# Patient Record
Sex: FEMALE | Race: OTHER | NOT HISPANIC OR LATINO
[De-identification: names, ages, dates, MRNs, and addresses within clinical notes are randomized per-mention and may not be internally consistent; named-entity substitution may affect disease eponyms.]

---
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
## 2020-01-30 ENCOUNTER — APPOINTMENT (OUTPATIENT)
Dept: OTOLARYNGOLOGY | Facility: CLINIC | Age: 20
End: 2020-01-30

## 2020-07-16 ENCOUNTER — RESULT REVIEW (OUTPATIENT)
Age: 20
End: 2020-07-16

## 2020-07-16 ENCOUNTER — APPOINTMENT (OUTPATIENT)
Dept: OTOLARYNGOLOGY | Facility: CLINIC | Age: 20
End: 2020-07-16
Payer: SELF-PAY

## 2020-07-16 VITALS
WEIGHT: 213 LBS | TEMPERATURE: 98.6 F | DIASTOLIC BLOOD PRESSURE: 93 MMHG | SYSTOLIC BLOOD PRESSURE: 145 MMHG | HEIGHT: 68 IN | HEART RATE: 103 BPM | BODY MASS INDEX: 32.28 KG/M2

## 2020-07-16 PROCEDURE — 11444 EXC FACE-MM B9+MARG 3.1-4 CM: CPT

## 2020-07-16 PROCEDURE — 88305 TISSUE EXAM BY PATHOLOGIST: CPT | Mod: 26

## 2020-07-16 PROCEDURE — 12052 INTMD RPR FACE/MM 2.6-5.0 CM: CPT

## 2020-07-16 NOTE — CONSULT LETTER
[Dear  ___] : Dear  [unfilled], [Courtesy Letter:] : I had the pleasure of seeing your patient, [unfilled], in my office today. [Sincerely,] : Sincerely, [FreeTextEntry2] : Braden Brown MD\par [address]\[place] [address] [FreeTextEntry1] : \par Ms. Alcantara underwent removal of LEFT ear lobe keloid, 4 cm, on July 16, 2020.\par \par She will return next week for suture removal.\par \par \par \par \par \par \par   [FreeTextEntry3] : \par Adelina Patterson MD \par Otolaryngology, Head and Neck Surgery \par \par

## 2020-07-16 NOTE — HISTORY OF PRESENT ILLNESS
[de-identified] : For removal of LEFT earlobe keloid.\par \par Ms Quinton has a 4 cm keloid on left medial ear lobe that is enlarging over 2 years and is painful to pressure.\par I recommended removal since it had been infected. Risks, benefits, and alternatives were discussed with the patient who agreed to proceed. Risks include, but are not limited to, bleeding, infection, recurrence, scar and contour change of the ear lobe.  Her questions were answered and she agreed to proceed. \par \par PROCEDURE:   LEFT Earloid keloid removal and intermediate repair\par ANESTHESIA:  1% lidocaine with 1:100,000 epinephrine injection \par EBL:  3 ml\par COMPLICATIONS:  None\par \par After informed consent was obtained, the patient was placed in a semirecumbent position.  The subcutaneous tissue inferior to the left ear lobe was injected with 1% lidocaine with 1:100,000 epinephrine for a greater auricular nerve block.  Once the lower half of the left ear was insensate, the skin and subcutaneous tissue at the attachment of the keloid was injected with 1% lidocaine with 1:100,000 epinephrine for vasoconstriction.  Cotton was placed in the left conchal bowl.  The lower left ear was prepped with Betadine solution.  The left lower ear was draped in sterile fashion. The keloid was about 4 cm in greatest diameter, with smaller attachment of just under 3 cm.. An incision was made through the skin of the medial ear lobe at the attachment of the keloid.  Blunt and sharp dissection was then used to separate the subcutaneous tissue from the keloid.  A 3 mm portion of the lateral earlobe skin, where the original piercing had been, was excised with the keloid.  The keloid was removed completely and sent in formalin to Pathology.\par \par Hemostasis was obtained with electrocautery pen.  The defect on the lateral side of the earlobe was about 1 x 0.4 cm; on the medial side was 4 x 0.8 cm.  The subcutaneous tissue was closed with 4-0 chromic.  The medial skin edges were reapproximated with 5-0 nylon running simple suture.  The lateral skin edges were reapproximated with 5-0 nylon running simple suture.  the skin was cleaned after the drapes were removed.  Bacitracin ointment was applied to both suture lines.\par \par The patient tolerated the procedure well.\par \par

## 2020-07-17 ENCOUNTER — OUTPATIENT (OUTPATIENT)
Dept: OUTPATIENT SERVICES | Facility: HOSPITAL | Age: 20
LOS: 1 days | End: 2020-07-17
Payer: COMMERCIAL

## 2020-07-17 DIAGNOSIS — L91.0 HYPERTROPHIC SCAR: ICD-10-CM

## 2020-07-17 PROCEDURE — 88305 TISSUE EXAM BY PATHOLOGIST: CPT

## 2020-07-21 LAB — SURGICAL PATHOLOGY STUDY: SIGNIFICANT CHANGE UP

## 2020-07-24 ENCOUNTER — APPOINTMENT (OUTPATIENT)
Dept: OTOLARYNGOLOGY | Facility: CLINIC | Age: 20
End: 2020-07-24
Payer: SELF-PAY

## 2020-07-24 VITALS
HEIGHT: 68 IN | DIASTOLIC BLOOD PRESSURE: 80 MMHG | SYSTOLIC BLOOD PRESSURE: 127 MMHG | WEIGHT: 213 LBS | TEMPERATURE: 95.6 F | HEART RATE: 80 BPM | BODY MASS INDEX: 32.28 KG/M2

## 2020-07-24 DIAGNOSIS — H92.02 OTALGIA, LEFT EAR: ICD-10-CM

## 2020-07-24 PROCEDURE — 99024 POSTOP FOLLOW-UP VISIT: CPT

## 2020-08-10 PROBLEM — H92.02 OTALGIA, LEFT: Status: RESOLVED | Noted: 2020-07-15 | Resolved: 2020-08-10

## 2020-08-10 RX ORDER — CEPHALEXIN 500 MG/1
500 CAPSULE ORAL 3 TIMES DAILY
Qty: 21 | Refills: 0 | Status: COMPLETED | COMMUNITY
Start: 2020-07-16 | End: 2020-07-23

## 2020-08-10 NOTE — ASSESSMENT
[FreeTextEntry1] : Sutures were removed and  left earlobe is healing well\par Patient is happy with results of left ear keloid removal.\par She has a small keloid in right earlobe that could be treated with steroid injection along.\par \par  Advised patient that she does not need to continue the antibiotics. \par Advised patient the scar will heal and to keep it out of the sun.\par \par Return in 1 month for steroid injection in ear lobes.

## 2020-08-10 NOTE — HISTORY OF PRESENT ILLNESS
[de-identified] : Ms. Alcantara is removal of keloid from left earlobe on July 16, 2020.\par Denies pus or significant pain at incision site. Incision site left a few drops of blood on her pillow the first night.\par She took 6 days of cephalexin but dropped the rest of the pills on the floor and stopped taking the antibiotics.\par She also c/o a small right ear lobe keloid that she would like to have treated.\par \par \par PATHOLOGY Left earlobe keloid (7/16/2020):\par -Skin with a hypertrophic scar (keloid) and foci of chronic inflammation.\par

## 2020-08-10 NOTE — PHYSICAL EXAM
[de-identified] : Left earlobe is healing well.  Sutures were removed; Steri strips placed.  RIGHT earlobe with 3-4mm keloid medial earlobe.

## 2020-08-10 NOTE — CONSULT LETTER
[Dear  ___] : Dear  [unfilled], [Sincerely,] : Sincerely, [Courtesy Letter:] : I had the pleasure of seeing your patient, [unfilled], in my office today. [FreeTextEntry1] : \par \par Enclosed please find my notes from office visit on July 24, 2020.\par \par  \par \par \par \par \par \par   [FreeTextEntry2] : Braden Brown MD\par [address]\[place] [address] [FreeTextEntry3] : \par Adelina Patterson MD \par Otolaryngology, Head and Neck Surgery \par \par

## 2020-08-26 ENCOUNTER — APPOINTMENT (OUTPATIENT)
Dept: OTOLARYNGOLOGY | Facility: CLINIC | Age: 20
End: 2020-08-26
Payer: COMMERCIAL

## 2020-08-26 VITALS
TEMPERATURE: 98.1 F | HEART RATE: 77 BPM | SYSTOLIC BLOOD PRESSURE: 127 MMHG | WEIGHT: 214 LBS | HEIGHT: 68 IN | BODY MASS INDEX: 32.43 KG/M2 | DIASTOLIC BLOOD PRESSURE: 79 MMHG

## 2020-08-26 PROCEDURE — 99213 OFFICE O/P EST LOW 20 MIN: CPT

## 2020-08-30 NOTE — HISTORY OF PRESENT ILLNESS
[de-identified] : Ms. Alcantara presents for follow up for ear keloids. \par S/p left ear keloid removal 7/16/2020.\par Left ear keloid healed with some scaring above the earlobe.\par Right ear has one keloid that she believes it is bigger and hurts when she lies on that side.\par She also thinks she has a keloid in vaginal area.\par \par \par PATHOLOGY Left earlobe keloid (7/18/2020)\par - skin with hypertrophic scar (keloid) and foci of chronic inflammation.\par

## 2020-08-30 NOTE — CONSULT LETTER
[Dear  ___] : Dear  [unfilled], [Courtesy Letter:] : I had the pleasure of seeing your patient, [unfilled], in my office today. [Sincerely,] : Sincerely, [FreeTextEntry2] : Braden Brown MD\par [address]\[place] [address] [FreeTextEntry1] : \par \par Enclosed please find my notes from office visit on August 26, 2020.\par \par  \par \par \par \par \par \par   [FreeTextEntry3] : \par Adelina Patterson MD \par Otolaryngology, Head and Neck Surgery \par \par

## 2020-08-30 NOTE — ASSESSMENT
[FreeTextEntry1] : Ms. Alcantara has right earlobe keloid and left earlobe hypertrophic scar after keloid injection.\par Both sites were injected with Kenalog today.\par \par PLAN:\par -- Use pressure earrings bilateral \par -- see Derm for vaginal keloid\par \par Return in 1 month for repeat injection in at least right earlobe keloid.\par \par

## 2020-08-30 NOTE — PHYSICAL EXAM
[Normal] : no abnormal secretions [de-identified] : 5 mm keloid on medial right earlobe, injected with Kenalog 40 mg/ml.  Left earupper lateral lobule with hypertrophic  scar 4 mm, injected intradermal with Kenalog 40 mg/ml.

## 2020-10-28 ENCOUNTER — APPOINTMENT (OUTPATIENT)
Dept: OTOLARYNGOLOGY | Facility: CLINIC | Age: 20
End: 2020-10-28
Payer: COMMERCIAL

## 2020-10-28 VITALS
HEIGHT: 68 IN | DIASTOLIC BLOOD PRESSURE: 97 MMHG | TEMPERATURE: 97.7 F | TEMPERATURE: 97.7 F | SYSTOLIC BLOOD PRESSURE: 132 MMHG | WEIGHT: 217.6 LBS | WEIGHT: 217.6 LBS | HEIGHT: 68 IN | BODY MASS INDEX: 32.98 KG/M2 | SYSTOLIC BLOOD PRESSURE: 132 MMHG | HEART RATE: 90 BPM | DIASTOLIC BLOOD PRESSURE: 97 MMHG | BODY MASS INDEX: 32.98 KG/M2 | HEART RATE: 90 BPM

## 2020-10-28 PROCEDURE — 99214 OFFICE O/P EST MOD 30 MIN: CPT | Mod: 25

## 2020-10-28 PROCEDURE — 11900 INJECT SKIN LESIONS </W 7: CPT

## 2020-10-28 PROCEDURE — 99072 ADDL SUPL MATRL&STAF TM PHE: CPT

## 2020-11-12 NOTE — ASSESSMENT
[FreeTextEntry1] : Ms. Alcantara is s/p excision of left earlobe keloid.  The left earlobe scar was injected intradermally with Kenalog.\par Right earlobe keloid on posterior surface is now 1 cm and too bulky to respond will to Kenalog injections.\par \par I recommended removal of keloid from RIGHT earlobe under local anesthesia in the office.\par Risks, benefits and alternatives were discussed with her. Risks include, but are not limited to, bleeding, infection, scar and recurrent keloid.\par After the removal, she may require intradermal steroid injections to prevent recurrence.\par She will return to office for procedure in November

## 2020-11-12 NOTE — PHYSICAL EXAM
[Normal] : tympanic membranes are normal in both ears [de-identified] : EACs clear bilateral  [de-identified] : 1 cm keloid on posterior right earlobe. Left earlobe healing scars anterior and posterior; scars injected with intradermal Kenalog.

## 2020-11-12 NOTE — CONSULT LETTER
[Dear  ___] : Dear  [unfilled], [Courtesy Letter:] : I had the pleasure of seeing your patient, [unfilled], in my office today. [Please see my note below.] : Please see my note below. [Sincerely,] : Sincerely, [FreeTextEntry2] : Braden Brown MD\par [address]\[place] [address] [FreeTextEntry1] : \par  \par \par \par \par \par \par   [FreeTextEntry3] : \par Adelina Patterson MD \par Otolaryngology, Head and Neck Surgery \par \par

## 2020-11-12 NOTE — HISTORY OF PRESENT ILLNESS
[de-identified] : Ms. Alcantara presents for follow up for ear keloids. \par \par S/p left ear keloid removal 7/16/2020.\par Left ear keloid healed with some keloid developing at front and back of the earlobe.\par \par Right ear has one keloid that she believes is bigger and hurts when she lies on that side.\par She has been using the compression earrings but it falls off the right ear because the keloid is so large.\par \par \par PATHOLOGY Left earlobe keloid (7/18/2020)\par - skin with hypertrophic scar (keloid) and foci of chronic inflammation.\par \par

## 2020-11-18 ENCOUNTER — NON-APPOINTMENT (OUTPATIENT)
Age: 20
End: 2020-11-18

## 2020-11-18 ENCOUNTER — APPOINTMENT (OUTPATIENT)
Dept: OTOLARYNGOLOGY | Facility: CLINIC | Age: 20
End: 2020-11-18

## 2020-12-23 ENCOUNTER — RESULT REVIEW (OUTPATIENT)
Age: 20
End: 2020-12-23

## 2020-12-23 ENCOUNTER — APPOINTMENT (OUTPATIENT)
Dept: OTOLARYNGOLOGY | Facility: CLINIC | Age: 20
End: 2020-12-23
Payer: COMMERCIAL

## 2020-12-23 ENCOUNTER — OUTPATIENT (OUTPATIENT)
Dept: OUTPATIENT SERVICES | Facility: HOSPITAL | Age: 20
LOS: 1 days | End: 2020-12-23
Payer: COMMERCIAL

## 2020-12-23 VITALS
SYSTOLIC BLOOD PRESSURE: 139 MMHG | HEART RATE: 71 BPM | HEIGHT: 68 IN | TEMPERATURE: 97.3 F | DIASTOLIC BLOOD PRESSURE: 88 MMHG | WEIGHT: 217 LBS | BODY MASS INDEX: 32.89 KG/M2

## 2020-12-23 PROCEDURE — 12051 INTMD RPR FACE/MM 2.5 CM/<: CPT

## 2020-12-23 PROCEDURE — 88305 TISSUE EXAM BY PATHOLOGIST: CPT

## 2020-12-23 PROCEDURE — 11402 EXC TR-EXT B9+MARG 1.1-2 CM: CPT

## 2020-12-23 PROCEDURE — 99072 ADDL SUPL MATRL&STAF TM PHE: CPT

## 2020-12-23 PROCEDURE — 88305 TISSUE EXAM BY PATHOLOGIST: CPT | Mod: 26

## 2020-12-23 PROCEDURE — 11900 INJECT SKIN LESIONS </W 7: CPT | Mod: 59

## 2020-12-28 DIAGNOSIS — L91.0 HYPERTROPHIC SCAR: ICD-10-CM

## 2020-12-29 LAB — SURGICAL PATHOLOGY STUDY: SIGNIFICANT CHANGE UP

## 2020-12-30 ENCOUNTER — APPOINTMENT (OUTPATIENT)
Dept: OTOLARYNGOLOGY | Facility: CLINIC | Age: 20
End: 2020-12-30
Payer: MEDICAID

## 2020-12-30 VITALS
SYSTOLIC BLOOD PRESSURE: 134 MMHG | HEIGHT: 68 IN | DIASTOLIC BLOOD PRESSURE: 87 MMHG | BODY MASS INDEX: 32.89 KG/M2 | SYSTOLIC BLOOD PRESSURE: 134 MMHG | HEART RATE: 72 BPM | TEMPERATURE: 98.1 F | WEIGHT: 217 LBS | HEIGHT: 68 IN | BODY MASS INDEX: 32.89 KG/M2 | TEMPERATURE: 98.1 F | DIASTOLIC BLOOD PRESSURE: 87 MMHG | HEART RATE: 72 BPM | WEIGHT: 217 LBS

## 2020-12-30 PROCEDURE — 99024 POSTOP FOLLOW-UP VISIT: CPT

## 2020-12-30 NOTE — CONSULT LETTER
[Dear  ___] : Dear  [unfilled], [Courtesy Letter:] : I had the pleasure of seeing your patient, [unfilled], in my office today. [Sincerely,] : Sincerely, [FreeTextEntry2] : Braden Brown MD\par [address]\[place] [address] [FreeTextEntry1] : \par Ms. Alcantara underwent removal of RIGHT ear lobe keloid, 2 cm, on December 23, 2020.\par \par She will return next week for suture removal.\par \par \par \par \par \par \par   [FreeTextEntry3] : \par Adelina Patterson MD \par Otolaryngology, Head and Neck Surgery \par \par

## 2020-12-30 NOTE — HISTORY OF PRESENT ILLNESS
[de-identified] : For removal of RIGHT earlobe keloid.\par \par Ms Alcantara has an almost 2 cm keloid on right medial ear lobe that is enlarging over past few months and is painful to pressure.  I initially injected the right earlobe keloid in October but it has continued to grow.\par A left earlobe keloid was removed in July 2020.  She has been wearing pressure earrings on left side but they keep falling off.\par \par I recommended removal of right ear keloid. Risks, benefits, and alternatives were discussed with the patient who agreed to proceed. Risks include, but are not limited to, bleeding, infection, recurrence, scar and contour change of the ear lobe. Her questions were answered and she agreed to proceed. \par \par --------------------------------------\par --------------------------------------\par \par PROCEDURE:   RIGHT Earlobe keloid removal and intermediate repair\par ANESTHESIA:  1% lidocaine with 1:100,000 epinephrine injection \par EBL:  3 ml\par COMPLICATIONS:  None\par \par After informed consent was obtained, the patient was placed in a semirecumbent position.  The subcutaneous tissue inferior to the right ear lobe was injected with 1% lidocaine with 1:100,000 epinephrine for a greater auricular nerve block.  Once the lower half of the right ear was insensate, the skin and subcutaneous tissue at the attachment of the keloid was injected with 1% lidocaine with 1:100,000 epinephrine for vasoconstriction.  The lower right ear was prepped with Betadine solution.  The right lower ear was draped in sterile fashion. The keloid was almost 2 cm in greatest diameter, with smaller attachment of just under 1.5 cm.  An incision was made through the skin of the medial ear lobe at the base of the keloid.  Blunt and sharp dissection was then used to separate the subcutaneous tissue from the keloid.  A 2 mm portion of the lateral earlobe skin, where the original piercing had been, was excised with the keloid.  The keloid was removed completely and sent in formalin to Pathology.\par Hemostasis was obtained with electrocautery pen.  \par The defect on the medial side of the earlobe was about 2.3 cm; on the lateral side about 3 mm.   The subcutaneous tissue was closed with 4-0 chromic.  The medial skin edges were reapproximated with 5-0 nylon running simple suture.  The medial and lateral skin edges were each reapproximated with 5-0 nylon running simple sutures.  \par The skin was cleaned after the drapes were removed.  Bacitracin ointment was applied to both suture lines.\par \par The patient tolerated the procedure well.\par \par

## 2020-12-30 NOTE — PHYSICAL EXAM
[de-identified] : Left ear lobe scar with mild thickening - injected intradermally with Kenalog 40 mg/ml.

## 2020-12-30 NOTE — PHYSICAL EXAM
[Normal] : tympanic membranes are normal in both ears [de-identified] : Right earlobe sutures removed and ster-strips placed. No erythema or edema. Left ear lobe scar mild thickening.

## 2020-12-30 NOTE — ASSESSMENT
[FreeTextEntry1] : Ms. Alcantara is s/p right ear keloid removal 12/23/2020.  Sutures removed today\par S/p left ear keloid removal 7/16/2020.\par \par PLAN:\par -- Use pressure earrings on left ear\par -- once steri-strips fall off right earlobe, massage surgical site and apply emollients \par \par Return in 3 weeks\par \par

## 2020-12-30 NOTE — ASSESSMENT
[FreeTextEntry1] : Right ear keloid was removed, and the defect was repaired.\par Left earlobe scar injected with Kenalog intradermally.\par \par Return in 1 week for suture removal from RIGHT earlobe.\par Wear pressure earrings on left earlobe.\par

## 2020-12-30 NOTE — HISTORY OF PRESENT ILLNESS
[de-identified] : Ms. Alcantara  is s/p right ear keloid removal 12/23/2020; also had intralesional steroid injection right wound and left earlobe scar.\par Had some bleeding the same day after procedure\par Denies any significant pain.\par S/p left ear keloid removal 7/16/2020.\par \par \par PATHOLOGY Skin of right earlobe, lesion excision (12/23/2020)\par - Keloid\par \par PATHOLOGY Left earlobe keloid (7/18/2020)\par - skin with hypertrophic scar (keloid) and foci of chronic inflammation.\par

## 2020-12-30 NOTE — CONSULT LETTER
[Dear  ___] : Dear  [unfilled], [Courtesy Letter:] : I had the pleasure of seeing your patient, [unfilled], in my office today. [Sincerely,] : Sincerely, [Please see my note below.] : Please see my note below. [FreeTextEntry2] : Braden Brown MD\par [address]\[place] [address] [FreeTextEntry1] : \par \par  \par \par \par \par \par \par   [FreeTextEntry3] : Adelina Patterson MD \par Otolaryngology, Head and Neck Surgery \par \par

## 2021-01-29 ENCOUNTER — APPOINTMENT (OUTPATIENT)
Dept: OTOLARYNGOLOGY | Facility: CLINIC | Age: 21
End: 2021-01-29
Payer: MEDICAID

## 2021-01-29 VITALS — HEART RATE: 88 BPM | TEMPERATURE: 98.1 F | SYSTOLIC BLOOD PRESSURE: 139 MMHG | DIASTOLIC BLOOD PRESSURE: 87 MMHG

## 2021-01-29 PROCEDURE — 99072 ADDL SUPL MATRL&STAF TM PHE: CPT

## 2021-01-29 PROCEDURE — 99213 OFFICE O/P EST LOW 20 MIN: CPT

## 2021-01-29 RX ORDER — IBUPROFEN 600 MG/1
600 TABLET, FILM COATED ORAL
Qty: 16 | Refills: 0 | Status: COMPLETED | COMMUNITY
Start: 2020-09-28 | End: 2021-01-29

## 2021-02-16 NOTE — CONSULT LETTER
[Dear  ___] : Dear  [unfilled], [Courtesy Letter:] : I had the pleasure of seeing your patient, [unfilled], in my office today. [Please see my note below.] : Please see my note below. [Sincerely,] : Sincerely, [FreeTextEntry2] : Braden Brown MD\par [address]\[place] [address] [FreeTextEntry1] : \par \par  \par \par \par \par \par \par   [FreeTextEntry3] : Adelina Patterson MD \par Otolaryngology, Head and Neck Surgery \par \par

## 2021-02-16 NOTE — ASSESSMENT
[FreeTextEntry1] : Ms. Alcantara is s/p right ear keloid removal 12/23/2020. Sutures removed today\par S/p left ear keloid removal 7/16/2020. \par Bilateral earlobe hypertrophic scars were injected with Kenalog again today.\par \par PLAN:\par -- Use pressure earrings daily while sleeping\par -- Massage surgical site \par -- Silicone gel sheeting to hypertrophic scars\par \par Return in 1 month\par

## 2021-02-16 NOTE — PHYSICAL EXAM
[de-identified] : Left ear lobe with mild thickening of anterior and posterior scars; each scar injected intradermal with Kenalog.  Right earlobe posterior keloid scar injected intradermal with Kenalog. [Normal] : no neck adenopathy

## 2021-02-16 NOTE — HISTORY OF PRESENT ILLNESS
[de-identified] : Ms. Alcantara is s/p right ear keloid removal 12/23/2020; also had intralesional steroid injection right wound and left earlobe scar.\par S/p left ear keloid removal 7/16/2020.\par \par Today she presents for intralesional scar injection on bilateral earlobes. \par She uses compression earrings but not every day. Denies any pain or other issues.\par \par \par PATHOLOGY Skin of right earlobe, lesion excision (12/23/2020)\par - Keloid\par \par PATHOLOGY Left earlobe keloid (7/18/2020)\par - Skin with hypertrophic scar (keloid) and foci of chronic inflammation.\par \par

## 2021-03-19 ENCOUNTER — APPOINTMENT (OUTPATIENT)
Dept: OTOLARYNGOLOGY | Facility: CLINIC | Age: 21
End: 2021-03-19
Payer: MEDICAID

## 2021-03-19 VITALS
HEIGHT: 68 IN | BODY MASS INDEX: 31.52 KG/M2 | WEIGHT: 208 LBS | SYSTOLIC BLOOD PRESSURE: 137 MMHG | DIASTOLIC BLOOD PRESSURE: 88 MMHG | TEMPERATURE: 96.3 F | HEART RATE: 79 BPM

## 2021-03-19 PROCEDURE — 11900 INJECT SKIN LESIONS </W 7: CPT

## 2021-03-19 PROCEDURE — 99072 ADDL SUPL MATRL&STAF TM PHE: CPT

## 2021-03-24 NOTE — CONSULT LETTER
[Dear  ___] : Dear  [unfilled], [Courtesy Letter:] : I had the pleasure of seeing your patient, [unfilled], in my office today. [Please see my note below.] : Please see my note below. [Sincerely,] : Sincerely, [FreeTextEntry2] : Braden Brown MD\par [address]\[place] [address] [FreeTextEntry1] : \par \par  \par \par \par \par \par \par   [FreeTextEntry3] : Adelina Patterson MD \par Otolaryngology, Head and Neck Surgery \par \par

## 2021-03-24 NOTE — HISTORY OF PRESENT ILLNESS
[de-identified] : Ms. Alcantara is s/p right ear keloid removal 12/23/2020; also had intralesional steroid injection right wound and left earlobe scar.\par S/p left ear keloid removal 7/16/2020.\par \par Today she presents for intralesional scar injection on bilateral earlobes. \par She feels her left ear is red and keloid is growing.\par She uses compression earrings but not every day because they hurt. Denies any pain or other issues.\par \par \par PATHOLOGY Skin of right earlobe, lesion excision (12/23/2020)\par - Keloid\par \par PATHOLOGY Left earlobe keloid (7/18/2020)\par - Skin with hypertrophic scar (keloid) and foci of chronic inflammation.\par

## 2021-03-24 NOTE — PHYSICAL EXAM
[de-identified] : Left ear lobe with thickening of anterior and posterior scars; each scar injected intradermal with Kenalog.  Right earlobe posterior keloid scar injected intradermal with Kenalog. [Normal] : no neck adenopathy

## 2021-05-04 ENCOUNTER — APPOINTMENT (OUTPATIENT)
Dept: OTOLARYNGOLOGY | Facility: CLINIC | Age: 21
End: 2021-05-04

## 2021-05-07 ENCOUNTER — APPOINTMENT (OUTPATIENT)
Dept: OTOLARYNGOLOGY | Facility: CLINIC | Age: 21
End: 2021-05-07
Payer: MEDICAID

## 2021-05-07 VITALS
TEMPERATURE: 97 F | BODY MASS INDEX: 31.67 KG/M2 | SYSTOLIC BLOOD PRESSURE: 169 MMHG | WEIGHT: 209 LBS | DIASTOLIC BLOOD PRESSURE: 105 MMHG | HEIGHT: 68 IN | HEART RATE: 97 BPM

## 2021-05-07 DIAGNOSIS — L91.0 HYPERTROPHIC SCAR: ICD-10-CM

## 2021-05-07 PROCEDURE — 99213 OFFICE O/P EST LOW 20 MIN: CPT

## 2021-05-07 PROCEDURE — 99072 ADDL SUPL MATRL&STAF TM PHE: CPT

## 2021-05-10 PROBLEM — L91.0 KELOID SCAR: Status: ACTIVE | Noted: 2019-10-17

## 2021-05-30 NOTE — HISTORY OF PRESENT ILLNESS
[de-identified] : Ms. Alcantara feels her left earlobe is red, and a keloid is growing. Right earlobe is fine.\par She uses compression earrings but not every day because they hurt. Denies any pain or other issues.\par S/p left ear keloid removal 7/16/2020.\par S/p right ear keloid removal 12/23/2020, w/ intralesional steroid injection right wound and left earlobe scar.\par She has had several intralesional scar injections since the keloid removals.\par \par \par \par \par \par PATHOLOGY Skin of right earlobe, lesion excision (12/23/2020)\par - Keloid\par \par PATHOLOGY Left earlobe keloid (7/18/2020)\par - Skin with hypertrophic scar (keloid) and foci of chronic inflammation.\par

## 2021-05-30 NOTE — ASSESSMENT
[FreeTextEntry1] : Ms. Alcantara had left earlobe hypertrophic scars injected with Kenalog again today.\par The right ear lobe scars are flat.\par \par PLAN:\par -- Use pressure earrings daily while sleeping\par -- Massage surgical site \par -- Silicone gel sheeting to hypertrophic scars\par \par Return in 1 month\par

## 2021-05-30 NOTE — PHYSICAL EXAM
[Normal] : no neck adenopathy [de-identified] : Left ear lobe with thickening of anterior and posterior scars; each scar injected intradermal with Kenalog.  Right earlobe posterior and anterior scars are flat.

## 2021-05-30 NOTE — CONSULT LETTER
[Dear  ___] : Dear  [unfilled], [Courtesy Letter:] : I had the pleasure of seeing your patient, [unfilled], in my office today. [Please see my note below.] : Please see my note below. [Sincerely,] : Sincerely, [FreeTextEntry2] : Braden Brown MD\par [address]\[place] [address] [FreeTextEntry1] : \par \par  \par \par \par \par \par \par   [FreeTextEntry3] : Adelina Patterson MD \par Otolaryngology, Head and Neck Surgery \par \par

## 2021-08-04 ENCOUNTER — APPOINTMENT (OUTPATIENT)
Dept: OTOLARYNGOLOGY | Facility: CLINIC | Age: 21
End: 2021-08-04

## 2021-09-01 ENCOUNTER — APPOINTMENT (OUTPATIENT)
Dept: OTOLARYNGOLOGY | Facility: CLINIC | Age: 21
End: 2021-09-01

## 2021-10-21 ENCOUNTER — APPOINTMENT (OUTPATIENT)
Dept: OTOLARYNGOLOGY | Facility: CLINIC | Age: 21
End: 2021-10-21

## 2021-11-04 ENCOUNTER — APPOINTMENT (OUTPATIENT)
Dept: OTOLARYNGOLOGY | Facility: CLINIC | Age: 21
End: 2021-11-04